# Patient Record
Sex: FEMALE | Race: WHITE | NOT HISPANIC OR LATINO | URBAN - METROPOLITAN AREA
[De-identification: names, ages, dates, MRNs, and addresses within clinical notes are randomized per-mention and may not be internally consistent; named-entity substitution may affect disease eponyms.]

---

## 2018-01-12 ENCOUNTER — EMERGENCY (EMERGENCY)
Facility: HOSPITAL | Age: 38
LOS: 1 days | Discharge: HOME | End: 2018-01-12
Attending: EMERGENCY MEDICINE | Admitting: EMERGENCY MEDICINE
Payer: COMMERCIAL

## 2018-01-12 VITALS
HEIGHT: 64 IN | WEIGHT: 169.98 LBS | HEART RATE: 101 BPM | SYSTOLIC BLOOD PRESSURE: 132 MMHG | TEMPERATURE: 98 F | RESPIRATION RATE: 18 BRPM | DIASTOLIC BLOOD PRESSURE: 79 MMHG | OXYGEN SATURATION: 99 %

## 2018-01-12 PROCEDURE — 29125 APPL SHORT ARM SPLINT STATIC: CPT | Mod: RT

## 2018-01-12 PROCEDURE — 99282 EMERGENCY DEPT VISIT SF MDM: CPT | Mod: 25

## 2018-01-12 PROCEDURE — 29125 APPL SHORT ARM SPLINT STATIC: CPT

## 2018-01-12 NOTE — ED PROVIDER NOTE - PHYSICAL EXAMINATION
Right hand: + healing laceration overlying distal 5th metacarpal on the palmar aspect, no erythema, no discharge, + hand swelling, Kanavel signs negative on  4th digits, + FROM of hand.

## 2018-01-12 NOTE — ED PROVIDER NOTE - OBJECTIVE STATEMENT
36 y/o female presents for evaluation of a dog bite wound. PT reports she was bit in the right hand on this past Tuesday and evaluated at urgent care on Wednesday.  She went today for follow up and was told the area was more swollen. She also c/o mild pain to the 4th digit. She was given the option of returning to the urgent care tomorrow for evaluation or coming to the ED and she came to the ED. She reports she was not resting her hand at home and was hammering at home. She denies fever, chills, or discharge from site

## 2018-01-12 NOTE — ED PROVIDER NOTE - ATTENDING CONTRIBUTION TO CARE
I, Marin Schaefer, performed the initial face to face bedside interview with this patient regarding history of present illness, review of symptoms and relevant past medical, social and family history.  I completed an independent physical examination.  I was the initial provider who evaluated this patient. I have signed out the follow up of any pending tests (i.e. labs, radiological studies) to the ACP.  I have communicated the patient’s plan of care and disposition with the ACP.  The history, relevant review of systems, past medical and surgical history, medical decision making, and physical examination was documented by the scribe in my presence and I attest to the accuracy of the documentation. I, Marin Schaefer, performed the initial face to face bedside interview with this patient regarding history of present illness, review of symptoms and relevant past medical, social and family history.  I completed an independent physical examination.  I was the initial provider who evaluated this patient. I have signed out the follow up of any pending tests (i.e. labs, radiological studies) to the ACP.  I have communicated the patient’s plan of care and disposition with the ACP.

## 2018-01-12 NOTE — ED ADULT NURSE NOTE - OBJECTIVE STATEMENT
pt alert and awake x3, states she was bit by a dog on 1/9, was treated at ER docs with abx, states that her hand has become swollen and warm, was sent here for eval, pt arrived with hand wrapped in dsg and sling from ER docs, denies fever/chills, denies chest pain/sob, positive pulses, received tetanus shot pta, pt states dog is up to date on shots.

## 2021-11-22 NOTE — ED PROVIDER NOTE - NEUROLOGICAL, MLM
Alert and oriented, no focal deficits, no motor or sensory deficits. Dapsone Pregnancy And Lactation Text: This medication is Pregnancy Category C and is not considered safe during pregnancy or breast feeding.